# Patient Record
(demographics unavailable — no encounter records)

---

## 2025-02-06 NOTE — HISTORY OF PRESENT ILLNESS
[FreeTextEntry1] : CHARU CARTAGENA is a 7 month M presenting for evaluation of RAD/asthma. Previously healthy full term infant. Tested positive for RSV and R/E around Thanksgiving. With RSV, he needed albuterol Q4H for upwards of 5 days until symptoms resolved. More recently, family was in [facility] and had a URI with wheezing on exam with retractions/nasal flaring/dyspnea. Parents took him to a peds pulmonologist there - SpO2 low 90's, tachypnea, poor air movement. Started on prednisolone 10 mg BID and albuterol 2.5 mg every 4 hours with ipratropium 250 mcg/dose; also started budesonide 0.25 mg BID. It took about 48 hours for him to settle down and family was able to decrease frequency of nebs. Prednisolone was given for 7 days total (10 mg BID x5 days, 10 mg QD x2 days). Last dose of prednisolone was this past Sunday on 2/2. Parents report otherwise well prior to getting RSV around 4 months of age. Had a few colds in between RSV (Nov 2024) and current illness. No recurrent cough when well and no persistent tachypnea/retractions. Parental asthma: father, sister History of eczema: yes Aeroallergen sensitization: denies Food allergies: denies Birth history: FT, +meconium, CPAP for a few minutes, no NICU stay Smokers in household: denies Grade:  Immunizations: UTD except for flu

## 2025-02-06 NOTE — CONSULT LETTER
[Dear  ___] : Dear  [unfilled], [Consult Letter:] : I had the pleasure of evaluating your patient, [unfilled]. [Please see my note below.] : Please see my note below. [Consult Closing:] : Thank you very much for allowing me to participate in the care of this patient.  If you have any questions, please do not hesitate to contact me. [Sincerely,] : Sincerely, [FreeTextEntry3] : Daquan Silva MD [facility]

## 2025-02-06 NOTE — PHYSICAL EXAM
[Well Nourished] : well nourished [Well Developed] : well developed [Alert] : ~L alert [Active] : active [Normal Breathing Pattern] : normal breathing pattern [No Respiratory Distress] : no respiratory distress [No Allergic Shiners] : no allergic shiners [No Drainage] : no drainage [No Conjunctivitis] : no conjunctivitis [No Nasal Drainage] : no nasal drainage [No Oral Pallor] : no oral pallor [No Stridor] : no stridor [Absence Of Retractions] : absence of retractions [Symmetric] : symmetric [Good Expansion] : good expansion [No Acc Muscle Use] : no accessory muscle use [Good aeration to bases] : good aeration to bases [Equal Breath Sounds] : equal breath sounds bilaterally [No Crackles] : no crackles [No Rhonchi] : no rhonchi [No Wheezing] : no wheezing [Normal Sinus Rhythm] : normal sinus rhythm [No Heart Murmur] : no heart murmur [Soft, Non-Tender] : soft, non-tender [Non Distended] : was not ~L distended [Full ROM] : full range of motion [No Clubbing] : no clubbing [Capillary Refill < 2 secs] : capillary refill less than two seconds [No Cyanosis] : no cyanosis [No Kyphoscoliosis] : no kyphoscoliosis [No Contractures] : no contractures [Alert and  Oriented] : alert and oriented [No Abnormal Focal Findings] : no abnormal focal findings [Normal Muscle Tone And Reflexes] : normal muscle tone and reflexes [No Rashes] : no rashes [FreeTextEntry3] : external exam normal [FreeTextEntry4] : external exam normal [FreeTextEntry5] : external exam normal [FreeTextEntry7] : +transmitted upper airway sounds

## 2025-02-06 NOTE — HISTORY OF PRESENT ILLNESS
[FreeTextEntry1] : CHARU CARTAGENA is a 7 month M presenting for evaluation of RAD/asthma. Previously healthy full term infant. Tested positive for RSV and R/E around Thanksgiving. With RSV, he needed albuterol Q4H for upwards of 5 days until symptoms resolved. More recently, family was in [facility] and had a URI with wheezing on exam with retractions/nasal flaring/dyspnea. Parents took him to a peds pulmonologist there - SpO2 low 90's, tachypnea, poor air movement. Started on prednisolone 10 mg BID and albuterol 2.5 mg every 4 hours with ipratropium 250 mcg/dose; also started budesonide 0.25 mg BID. It took about 48 hours for him to settle down and family was able to decrease frequency of nebs. Prednisolone was given for 7 days total (10 mg BID x5 days, 10 mg QD x2 days). Last dose of prednisolone was this past Sunday on 2/2. Parents report otherwise well prior to getting RSV around 4 months of age. Had a few colds in between RSV (Nov 2024) and current illness. No recurrent cough when well and no persistent tachypnea/retractions. Parental asthma: father, sister History of eczema: yes Aeroallergen sensitization: denies Food allergies: denies Birth history: FT, +meconium, CPAP for a few minutes, no NICU stay Smokers in household: denies Grade:  Immunizations: UTD except for flu

## 2025-02-06 NOTE — ASSESSMENT
[FreeTextEntry1] : CHARU CARTAGENA is a 7 month M with multiple risk factors for asthma, including extensive history of asthma in the family (sister, father) and atopic dermatitis, presenting for evaluation of recurrent wheezing responsive to both bronchodilators and oral corticosteroids. Also with RSV infection less than 6 months of age which may be a precipitant to the development of RAD/asthma. Just recently finished course of oral corticosteroids following an international trip. Lungs are clear and well-aerated however with some transmitted upper airways. Discussed that symptoms of chronic cough and/or recurrent wheeze with a response to bronchodilators are consistent with asthma or the high likelihood of developing asthma. Currently on budesonide BID and will transition to Fluticasone Propionate HFA 44 mcg/ACT for optimization of control of bronchial inflammation and AHR. No confounders at this point such as sleep disordered breathing or YOVANY. History, exam, trajectory or course are not supportive of alternative diagnoses such as CF, primary ciliary dyskinesia, immune deficiency, or congenital airway anomalies.  I have reviewed the asthma care plan and discussed it in detail with the family. I have discussed the pathophysiology of asthma, management strategies namely the roles of asthma medications and identified them by name (including long term control/preventative medications and quick relief medications that relieve symptoms), and goals of care. I have discussed safety and efficacy of inhaled ICS. I have discussed and reviewed the rationale for and importance of adherence to long term control medication to prevent symptoms and control asthma. Additionally, I discussed signs of respiratory distress and when to seek medical attention.   Based on the above assessment, my recommendations are as follows: 1. Take 2 puffs of Fluticasone Propionate HFA 44 mcg/ACT 2 times a day using your spacer +/- mask. Instructed to use an infant mask. 2. Start albuterol, 2 puffs via spacer every 4 - 6 hours as needed for cough, wheeze or difficulty breathing. 3. At the first sign of an illness, start albuterol 2-3x per day and increase as needed as per above. 4. Recommend a yearly flu vaccine - family to discuss with PCP as patient will need two doses (1 month apart) 5. Return to clinic in 4 months, or sooner as needed.  Discussed above assessment and management plan. Parent agreed with plan. All queries were answered.

## 2025-02-06 NOTE — REVIEW OF SYSTEMS
[Immunizations are up to date] : Immunizations are up to date [NI] : Allergic [Nl] : Endocrine [Frequent URIs] : frequent upper respiratory infections [Wheezing] : wheezing [Cough] : cough [Eczema] : eczema [Frequent Croup] : no frequent croup [Sinus Problems] : no sinus problems [Recurrent Ear Infections] : no recurrent ear infections [Pneumonia] : no pneumonia [Influenza Vaccine this Past Year] : no influenza vaccine this past year

## 2025-02-06 NOTE — CONSULT LETTER
[Dear  ___] : Dear  [unfilled], [Consult Letter:] : I had the pleasure of evaluating your patient, [unfilled]. [Please see my note below.] : Please see my note below. [Consult Closing:] : Thank you very much for allowing me to participate in the care of this patient.  If you have any questions, please do not hesitate to contact me. [Sincerely,] : Sincerely, [FreeTextEntry3] : Daquan Silva MD [facility]

## 2025-02-06 NOTE — REASON FOR VISIT
[Initial Consultation] : an initial consultation for [Asthma/RAD] : asthma/RAD [Parents] : parents [Wheezing] : wheezing

## 2025-02-06 NOTE — CONSULT LETTER
[Dear  ___] : Dear  [unfilled], [Consult Letter:] : I had the pleasure of evaluating your patient, [unfilled]. [Please see my note below.] : Please see my note below. [Consult Closing:] : Thank you very much for allowing me to participate in the care of this patient.  If you have any questions, please do not hesitate to contact me. [Sincerely,] : Sincerely, [FreeTextEntry3] : Daquan Silva MD [facility]

## 2025-02-06 NOTE — HISTORY OF PRESENT ILLNESS
[FreeTextEntry1] : CHARU CARTAGENA is a 7 month M presenting for evaluation of RAD/asthma. Previously healthy full term infant. Tested positive for RSV and R/E around Thanksgiving. With RSV, he needed albuterol Q4H for upwards of 5 days until symptoms resolved. More recently, family was in [facility] and had a URI with wheezing on exam with retractions/nasal flaring/dyspnea. Parents took him to a peds pulmonologist there - SpO2 low 90's, tachypnea, poor air movement. Started on prednisolone 10 mg BID and albuterol 2.5 mg every 4 hours with ipratropium 250 mcg/dose; also started budesonide 0.25 mg BID. It took about 48 hours for him to settle down and family was able to decrease frequency of nebs. Prednisolone was given for 7 days total (10 mg BID x5 days, 10 mg QD x2 days). Last dose of prednisolone was this past Sunday on 2/2. Parents report otherwise well prior to getting RSV around 4 months of age. Had a few colds in between RSV (Nov 2024) and current illness. No recurrent cough when well and no persistent tachypnea/retractions. Parental asthma: father, sister History of eczema: yes Aeroallergen sensitization: denies Food allergies: denies Birth history: FT, +meconium, CPAP for a few minutes, no NICU stay Smokers in household: denies Grade:  Immunizations: UTD except for flu

## 2025-06-19 NOTE — PHYSICAL EXAM
[Well Nourished] : well nourished [Well Developed] : well developed [Alert] : ~L alert [Active] : active [Normal Breathing Pattern] : normal breathing pattern [No Respiratory Distress] : no respiratory distress [No Allergic Shiners] : no allergic shiners [No Drainage] : no drainage [No Conjunctivitis] : no conjunctivitis [No Nasal Drainage] : no nasal drainage [No Oral Pallor] : no oral pallor [No Stridor] : no stridor [Symmetric] : symmetric [Absence Of Retractions] : absence of retractions [Good Expansion] : good expansion [No Acc Muscle Use] : no accessory muscle use [Good aeration to bases] : good aeration to bases [Equal Breath Sounds] : equal breath sounds bilaterally [No Crackles] : no crackles [No Rhonchi] : no rhonchi [No Wheezing] : no wheezing [Normal Sinus Rhythm] : normal sinus rhythm [No Heart Murmur] : no heart murmur [Soft, Non-Tender] : soft, non-tender [Non Distended] : was not ~L distended [Full ROM] : full range of motion [Capillary Refill < 2 secs] : capillary refill less than two seconds [No Clubbing] : no clubbing [No Cyanosis] : no cyanosis [No Kyphoscoliosis] : no kyphoscoliosis [No Contractures] : no contractures [Alert and  Oriented] : alert and oriented [No Abnormal Focal Findings] : no abnormal focal findings [Normal Muscle Tone And Reflexes] : normal muscle tone and reflexes [No Rashes] : no rashes [FreeTextEntry3] : external exam normal [FreeTextEntry4] : external exam normal [FreeTextEntry5] : external exam normal [FreeTextEntry7] : +transmitted upper airway sounds

## 2025-06-19 NOTE — REVIEW OF SYSTEMS
[NI] : Allergic [Nl] : Endocrine [Frequent URIs] : frequent upper respiratory infections [Eczema] : eczema [Immunizations are up to date] : Immunizations are up to date [Frequent Croup] : no frequent croup [Sinus Problems] : no sinus problems [Recurrent Ear Infections] : no recurrent ear infections [Wheezing] : no wheezing [Cough] : no cough [Pneumonia] : no pneumonia [Influenza Vaccine this Past Year] : no influenza vaccine this past year

## 2025-06-19 NOTE — PHYSICAL EXAM
[Well Nourished] : well nourished [Well Developed] : well developed [Alert] : ~L alert [Active] : active [Normal Breathing Pattern] : normal breathing pattern [No Respiratory Distress] : no respiratory distress [No Allergic Shiners] : no allergic shiners [No Drainage] : no drainage [No Conjunctivitis] : no conjunctivitis [No Nasal Drainage] : no nasal drainage [No Oral Pallor] : no oral pallor [No Stridor] : no stridor [Absence Of Retractions] : absence of retractions [Symmetric] : symmetric [Good Expansion] : good expansion [No Acc Muscle Use] : no accessory muscle use [Good aeration to bases] : good aeration to bases [Equal Breath Sounds] : equal breath sounds bilaterally [No Crackles] : no crackles [No Wheezing] : no wheezing [No Rhonchi] : no rhonchi [Normal Sinus Rhythm] : normal sinus rhythm [No Heart Murmur] : no heart murmur [Soft, Non-Tender] : soft, non-tender [Non Distended] : was not ~L distended [Full ROM] : full range of motion [No Clubbing] : no clubbing [Capillary Refill < 2 secs] : capillary refill less than two seconds [No Cyanosis] : no cyanosis [No Kyphoscoliosis] : no kyphoscoliosis [No Contractures] : no contractures [Alert and  Oriented] : alert and oriented [No Abnormal Focal Findings] : no abnormal focal findings [Normal Muscle Tone And Reflexes] : normal muscle tone and reflexes [No Rashes] : no rashes [FreeTextEntry3] : external exam normal [FreeTextEntry4] : external exam normal [FreeTextEntry5] : external exam normal [FreeTextEntry7] : +transmitted upper airway sounds

## 2025-06-19 NOTE — REASON FOR VISIT
B positive [Routine Follow-Up] : a routine follow-up visit for [Asthma/RAD] : asthma/RAD [Wheezing] : wheezing [Parents] : parents [Medical Records] : medical records

## 2025-06-19 NOTE — HISTORY OF PRESENT ILLNESS
[FreeTextEntry1] : 6/2025 Interval History: Fluticasone Propionate HFA was not approved by insurance but instead Asmanex was approved. Doing really well. Has been sick but has been well. Needed albuterol for a two days with a cold. Has had more recent colds and done well. Currently on Fluticasone Propionate HFA 44 mcg/ACT 2p BID with spacer - good adherence as per family -- has been off of it for the last 3-4 weeks Recent ER visits/hospitalizations: denies Last oral steroid course: denies Recurrent cough or wheeze: denies when well Recurrent nocturnal cough or wheeze: denies when well  2/2025 - initial visit CHARU CARTAGENA is a 7 month M presenting for evaluation of RAD/asthma. Previously healthy full term infant. Tested positive for RSV and R/E around Thanksgiving. With RSV, he needed albuterol Q4H for upwards of 5 days until symptoms resolved. More recently, family was in [facility] and had a URI with wheezing on exam with retractions/nasal flaring/dyspnea. Parents took him to a peds pulmonologist there - SpO2 low 90's, tachypnea, poor air movement. Started on prednisolone 10 mg BID and albuterol 2.5 mg every 4 hours with ipratropium 250 mcg/dose; also started budesonide 0.25 mg BID. It took about 48 hours for him to settle down and family was able to decrease frequency of nebs. Prednisolone was given for 7 days total (10 mg BID x5 days, 10 mg QD x2 days). Last dose of prednisolone was this past Sunday on 2/2. Parents report otherwise well prior to getting RSV around 4 months of age. Had a few colds in between RSV (Nov 2024) and current illness. No recurrent cough when well and no persistent tachypnea/retractions. Parental asthma: father, sister History of eczema: yes Aeroallergen sensitization: denies Food allergies: denies Birth history: FT, +meconium, CPAP for a few minutes, no NICU stay Smokers in household: denies Grade:  Immunizations: UTD except for flu

## 2025-06-19 NOTE — CONSULT LETTER
[Dear  ___] : Dear  [unfilled], [Consult Letter:] : I had the pleasure of evaluating your patient, [unfilled]. [Please see my note below.] : Please see my note below. [Consult Closing:] : Thank you very much for allowing me to participate in the care of this patient.  If you have any questions, please do not hesitate to contact me. [Sincerely,] : Sincerely, [Courtesy Letter:] : I had the pleasure of seeing your patient, [unfilled], in my office today. [FreeTextEntry3] : Daquan Silva MD [facility]

## 2025-06-19 NOTE — PHYSICAL EXAM
[Well Nourished] : well nourished [Well Developed] : well developed [Alert] : ~L alert [Active] : active [Normal Breathing Pattern] : normal breathing pattern [No Allergic Shiners] : no allergic shiners [No Respiratory Distress] : no respiratory distress [No Drainage] : no drainage [No Conjunctivitis] : no conjunctivitis [No Nasal Drainage] : no nasal drainage [No Oral Pallor] : no oral pallor [No Stridor] : no stridor [Absence Of Retractions] : absence of retractions [Symmetric] : symmetric [Good Expansion] : good expansion [No Acc Muscle Use] : no accessory muscle use [Good aeration to bases] : good aeration to bases [Equal Breath Sounds] : equal breath sounds bilaterally [No Crackles] : no crackles [No Wheezing] : no wheezing [No Rhonchi] : no rhonchi [Normal Sinus Rhythm] : normal sinus rhythm [No Heart Murmur] : no heart murmur [Soft, Non-Tender] : soft, non-tender [Non Distended] : was not ~L distended [Full ROM] : full range of motion [Capillary Refill < 2 secs] : capillary refill less than two seconds [No Clubbing] : no clubbing [No Cyanosis] : no cyanosis [No Kyphoscoliosis] : no kyphoscoliosis [No Contractures] : no contractures [Alert and  Oriented] : alert and oriented [No Abnormal Focal Findings] : no abnormal focal findings [Normal Muscle Tone And Reflexes] : normal muscle tone and reflexes [No Rashes] : no rashes [FreeTextEntry3] : external exam normal [FreeTextEntry4] : external exam normal [FreeTextEntry5] : external exam normal [FreeTextEntry7] : +transmitted upper airway sounds

## 2025-06-19 NOTE — HISTORY OF PRESENT ILLNESS
[FreeTextEntry1] : 6/2025 Interval History: Fluticasone Propionate HFA was not approved by insurance but instead Asmanex was approved. Doing really well. Has been sick but has been well. Needed albuterol for a two days with a cold. Has had more recent colds and done well. Currently on Fluticasone Propionate HFA 44 mcg/ACT 2p BID with spacer - good adherence as per family -- has been off of it for the last 3-4 weeks Recent ER visits/hospitalizations: denies Last oral steroid course: denies Recurrent cough or wheeze: denies when well Recurrent nocturnal cough or wheeze: denies when well  2/2025 - initial visit CHARU CARTAGENA is a 7 month M presenting for evaluation of RAD/asthma. Previously healthy full term infant. Tested positive for RSV and R/E around Thanksgiving. With RSV, he needed albuterol Q4H for upwards of 5 days until symptoms resolved. More recently, family was in [facility] and had a URI with wheezing on exam with retractions/nasal flaring/dyspnea. Parents took him to a peds pulmonologist there - SpO2 low 90's, tachypnea, poor air movement. Started on prednisolone 10 mg BID and albuterol 2.5 mg every 4 hours with ipratropium 250 mcg/dose; also started budesonide 0.25 mg BID. It took about 48 hours for him to settle down and family was able to decrease frequency of nebs. Prednisolone was given for 7 days total (10 mg BID x5 days, 10 mg QD x2 days). Last dose of prednisolone was this past Sunday on 2/2. Parents report otherwise well prior to getting RSV around 4 months of age. Had a few colds in between RSV (Nov 2024) and current illness. No recurrent cough when well and no persistent tachypnea/retractions. Parental asthma: father, sister History of eczema: yes Aeroallergen sensitization: denies Food allergies: denies Birth history: FT, +meconium, CPAP for a few minutes, no NICU stay Smokers in household: denies Grade:  Immunizations: UTD except for flu

## 2025-06-19 NOTE — REASON FOR VISIT
[Routine Follow-Up] : a routine follow-up visit for [Asthma/RAD] : asthma/RAD [Wheezing] : wheezing [Parents] : parents [Medical Records] : medical records

## 2025-06-19 NOTE — HISTORY OF PRESENT ILLNESS
[FreeTextEntry1] : 6/2025 Interval History: Fluticasone Propionate HFA was not approved by insurance but instead Asmanex was approved. Doing really well. Has been sick but has been well. Needed albuterol for a two days with a cold. Has had more recent colds and done well. Currently on Fluticasone Propionate HFA 44 mcg/ACT 2p BID with spacer - good adherence as per family -- has been off of it for the last 3-4 weeks Recent ER visits/hospitalizations: denies Last oral steroid course: denies Recurrent cough or wheeze: denies when well Recurrent nocturnal cough or wheeze: denies when well  2/2025 - initial visit CHARU CARTAGENA is a 7 month M presenting for evaluation of RAD/asthma. Previously healthy full term infant. Tested positive for RSV and R/E around Thanksgiving. With RSV, he needed albuterol Q4H for upwards of 5 days until symptoms resolved. More recently, family was in [facility] and had a URI with wheezing on exam with retractions/nasal flaring/dyspnea. Parents took him to a peds pulmonologist there - SpO2 low 90's, tachypnea, poor air movement. Started on prednisolone 10 mg BID and albuterol 2.5 mg every 4 hours with ipratropium 250 mcg/dose; also started budesonide 0.25 mg BID. It took about 48 hours for him to settle down and family was able to decrease frequency of nebs. Prednisolone was given for 7 days total (10 mg BID x5 days, 10 mg QD x2 days). Last dose of prednisolone was this past Sunday on 2/2. Parents report otherwise well prior to getting RSV around 4 months of age. Had a few colds in between RSV (Nov 2024) and current illness. No recurrent cough when well and no persistent tachypnea/retractions. Parental asthma: father, sister History of eczema: yes Aeroallergen sensitization: denies Food allergies: denies Birth history: FT, +meconium, CPAP for a few minutes, no NICU stay Smokers in household: denies Grade:  Immunizations: UTD except for flu

## 2025-06-19 NOTE — ASSESSMENT
[FreeTextEntry1] : CHARU CARTAGENA is a 12 month M with multiple risk factors for asthma, including extensive history of asthma in the family (sister, father) and atopic dermatitis, presenting for follow up evaluation of recurrent wheezing responsive to both bronchodilators and oral corticosteroids. Also with RSV infection less than 6 months of age which may be a precipitant to the development of RAD/asthma. Started on maintenance therapy with low-dose Fluticasone Propionate HFA at the last visit and doing well overall since last visit and in general, noted to have a great winter compared to prior. Previously on Fluticasone Propionate HFA 44mcg/ACT as maintenance therapy however family discontinued it about one month ago. Has been sick with viral URI's on a few occasions however symptoms have been manageable and resolved within a few days. He is well-appearing in the office today with lungs that are clear bilaterally with good aeration. Okay to remain off his inhaled corticosteroid for the summer however very low threshold to restart it on a twice daily basis if symptoms worsen. Can use his inhaled corticosteroid dynamically with respiratory illnesses this summer, however, suggest re-initiation of it twice daily in early September to optimize control of bronchial inflammation/AHR this fall/winter.  No confounders at this point such as sleep disordered breathing or YOVANY. History, exam, trajectory or course are not supportive of alternative diagnoses such as CF, primary ciliary dyskinesia, immune deficiency, or congenital airway anomalies.   Based on the above assessment, my recommendations are as follows: 1. Can remain off his inhaled corticosteroid for the summer. Over the summer, with respiratory illnesses, can restart Fluticasone Propionate HFA 44 mcg/ACT 2 puffs twice daily 10-14 days or stop sooner when symptoms resolve. 2. One week prior to school starting in September, start Fluticasone Propionate HFA 44 mcg/ACT 2 puffs twice daily with spacer. Rinse mouth out afterwards. 3. Take 2 puffs (or 1 vial via nebulization 2.5 mg/3 mL) of albuterol every 4-6 hours via a spacer +/- mask as needed for cough, wheezing, or shortness of breath. 4. At the first sign of an illness, please start albuterol 2-3x per day and increase as above as needed. 5. Recommend a yearly flu vaccine. 6. Please let me know if Fluticasone Propionate HFA is not covered. Can use Asmanex instead if needed. 7. Return to clinic in 4-6 months, or sooner as needed.  Discussed above assessment and management plan. Parent agreed with plan. All queries were answered.

## 2025-06-19 NOTE — CONSULT LETTER
[Dear  ___] : Dear  [unfilled], [Consult Letter:] : I had the pleasure of evaluating your patient, [unfilled]. [Please see my note below.] : Please see my note below. [Consult Closing:] : Thank you very much for allowing me to participate in the care of this patient.  If you have any questions, please do not hesitate to contact me. [Sincerely,] : Sincerely, [Courtesy Letter:] : I had the pleasure of seeing your patient, [unfilled], in my office today. [FreeTextEntry3] : Daquan Silva MD [facility]

## 2025-06-19 NOTE — CONSULT LETTER
[Dear  ___] : Dear  [unfilled], [Consult Letter:] : I had the pleasure of evaluating your patient, [unfilled]. [Please see my note below.] : Please see my note below. [Consult Closing:] : Thank you very much for allowing me to participate in the care of this patient.  If you have any questions, please do not hesitate to contact me. [Sincerely,] : Sincerely, [Courtesy Letter:] : I had the pleasure of seeing your patient, [unfilled], in my office today. [FreeTextEntry3] : Daquan Silva MD [facility]